# Patient Record
Sex: FEMALE | Race: WHITE
[De-identification: names, ages, dates, MRNs, and addresses within clinical notes are randomized per-mention and may not be internally consistent; named-entity substitution may affect disease eponyms.]

---

## 2020-02-26 ENCOUNTER — HOSPITAL ENCOUNTER (OUTPATIENT)
Dept: HOSPITAL 56 - MW.SDS | Age: 40
LOS: 1 days | Discharge: HOME | End: 2020-02-27
Payer: COMMERCIAL
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
## 2020-02-26 DIAGNOSIS — S83.512A: Primary | ICD-10-CM

## 2020-02-26 DIAGNOSIS — Z79.899: ICD-10-CM

## 2020-02-26 DIAGNOSIS — S83.242A: ICD-10-CM
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
## 2020-02-26 DIAGNOSIS — Z88.1: ICD-10-CM

## 2020-02-26 PROCEDURE — 97161 PT EVAL LOW COMPLEX 20 MIN: CPT

## 2020-02-26 PROCEDURE — C1762 CONN TISS, HUMAN(INC FASCIA): HCPCS

## 2020-02-26 PROCEDURE — 29888 ARTHRS AID ACL RPR/AGMNTJ: CPT

## 2020-02-26 PROCEDURE — 29881 ARTHRS KNE SRG MNISECTMY M/L: CPT

## 2020-02-26 PROCEDURE — C1713 ANCHOR/SCREW BN/BN,TIS/BN: HCPCS

## 2020-02-26 PROCEDURE — 97530 THERAPEUTIC ACTIVITIES: CPT

## 2020-02-26 PROCEDURE — C1776 JOINT DEVICE (IMPLANTABLE): HCPCS

## 2020-02-26 PROCEDURE — 81025 URINE PREGNANCY TEST: CPT

## 2020-02-26 RX ADMIN — FENTANYL CITRATE PRN MCG: 50 INJECTION, SOLUTION INTRAMUSCULAR; INTRAVENOUS at 12:10

## 2020-02-26 RX ADMIN — FENTANYL CITRATE PRN MCG: 50 INJECTION, SOLUTION INTRAMUSCULAR; INTRAVENOUS at 12:15

## 2020-02-26 RX ADMIN — OXYCODONE HYDROCHLORIDE AND ACETAMINOPHEN PRN TAB: 5; 325 TABLET ORAL at 20:52

## 2020-02-26 NOTE — PCM.OPNOTE
- General Post-Op/Procedure Note


Date of Surgery/Procedure: 02/26/20


Operative Procedure(s): left acl reconstruction.  partial medial menisectomy


Pre Op Diagnosis: left acl tear


Post-Op Diagnosis: left acl tear.  left medial meniscus tear


Anesthesia Technique: General ET Tube


Primary Surgeon: Christ Robles


EBL in mLs: 25


Complications: None


Condition: Good


Free Text/Narrative:: 


 Intake & Output











 02/25/20 02/26/20 02/26/20





 22:59 06:59 14:59


 


Intake Total   1400


 


Balance   1400

## 2020-02-26 NOTE — OR
SURGEON:

Christ Robles

 

DATE OF PROCEDURE:  02/26/2020

 

PREOPERATIVE DIAGNOSIS:

Left knee anterior cruciate ligament tear.

 

POSTOPERATIVE DIAGNOSES:

1. Left knee anterior cruciate ligament tear.

2. Left knee medial meniscus tear, anterior.

 

PRIMARY SURGEON:

Christ Robles DO.

 

ASSISTANT:

LISSETTE Iqbal.

 

ROLE OF ASSISTANT:

Nurse practitioner, LISSETTE Iqbal, played an essential role in assisting in

this case, helping to position the patient, retract structures as needed, as

well as suturing and cutting sutures as indicated.  Her presence improved

patient's safety and decreased operative time.

 

ANESTHESIA:

General endotracheal intubation.

 

FLUID:

Lactated Ringer's solution.

 

ESTIMATED BLOOD LOSS:

25 mL.

 

COMPLICATION:

None.

 

SPECIMEN:

None.

 

DISCHARGE DISPOSITION:

Stable to PACU.

 

INSTRUMENTATION:

Ruddy.

 

HISTORY AND INDICATIONS FOR THE PROCEDURE:

The patient was seen preoperatively in the clinic.  She had undergone a skiing

accident.  Preoperative imaging confirmed the above-mentioned diagnosis.  Risks

and benefits of the procedure explained to the patient.  Informed consent was

obtained.

 

DETAILS OF PROCEDURE:

The patient was seen preoperatively by myself and the Anesthesia staff in the

preoperative holding area where the operative site was marked.  She was brought

to the operative suite by the Anesthesia staff where general anesthesia was

administered.  All extremities were found to be well padded.  The right leg was

placed into a stirrup.  The left lower extremity was placed into a leg dumont.

The left lower extremity had a well-padded tourniquet placed.  It was then

prepped and draped in a sterile manner.  Time-out was called identifying the

correct patient, the correct procedure, the correct site, and that antibiotics

had been given within appropriate period of time.

 

The left lower extremity was exsanguinated.  Tourniquet was raised to 250 mmHg

and let down during closure.  A lateral portal was first made and then the joint

was explored.  There was significant difficulty with visualization due to

inflamed synovium.  I did not see any patellofemoral arthritis present.  Both

compartments looked good without any chondromalacia, medial and laterally.

There was a medial meniscus tear present.  I made a medial portal and then

debrided this with a shaver and an ablation unit.  I then inspected the anterior

cruciate ligament.  There were two balls of frayed tendon at the base that had

been fibrosed down the insertion and the tibia looked good.  However, upon

further inspection, almost 90% had been torn off from the femoral footprint.  At

that time, I took a shaver and took off the rest of the femoral footprint of the

ACL and then took Metzenbaum's and cut the distal stump.  I then used a shaver

to remove the remainder of the anterior cruciate ligament.  I then used the

ablation unit to clear the lateral notch as well as the stump.  I then used a

bur to perform the notchplasty inside of the lateral condyle.  At this point, my

assistant prepared our graft.  This measured 8.5 mm.  I used a 7 mm guide for

the back wall and then drilled my pin through and out the lateral femur and then

used a knife to bring it out through the skin.  We had dropped our measuring

instrument, so I drilled with a 4.5 reamer.  This measured to the outside wall

approximately 36 to 38.  I marked that for my button on the graft strands and

then 25 mm on the graft.  I then overdrilled 25 mm with an 8.5 reamer.  I then

passed my suture, passing suture up through the femur and then tagged it from

the lateral portal.  I then focussed on tibial prep.  I made my incision

horizontally about 2-1/2 fingerbreadths distal to the joint line, 3/4 of the way

to the medial side of the tibia.  I then used a 55 degree reamer and drilled the

pin.  At that point, I removed my guide and then reamed with an 8.5 mm reamer.

I then got pituitaries and grabbed the suture loop and then passed it distally

through the tibia.  I then attached my graft and then pulled it all the way

through the femoral side until the button was through the distal femoral cortex.

I then flipped the button and then pulled on it to make sure it was secure.  I

then brought my graft back up through the femoral tunnel until 25 mm had passed,

which I previously marked.  I then cycled the knee, and then at that point, I

placed my guide pin for my interference screw in the tibia and then screwed in

my interference screw.  This provided excellent stability with a negative

anterior drawer.  We then had my assistant then close all of my incisions with 3-

0 nylon followed by Betadine-soaked Adaptic, fluffs, and an Ace wrap.  She was

then taken to the PACU in stable condition.

 

 

[id number] / MODL

DD:  02/26/2020 14:37:20

DT:  02/26/2020 16:58:06

Job #:  [id number]

## 2020-02-26 NOTE — PCM.PREANE
Preanesthetic Assessment





- Anesthesia/Transfusion/Family Hx


Anesthesia History: No Prior Anesthesia


Family History of Anesthesia Reaction: No


Transfusion History: No Prior Transfusion(s)





- Review of Systems


General: No Symptoms


Pulmonary: No Symptoms


Cardiovascular: No Symptoms


Gastrointestinal: No Symptoms


Neurological: No Symptoms


Other: Reports: None





- Physical Assessment


NPO Status Date: 02/25/20


NPO Status Time: 22:30


Vital Signs: 





 Last Vital Signs











Temp  97.0 F   02/26/20 08:06


 


Pulse  91   02/26/20 08:06


 


Resp  18   02/26/20 08:06


 


BP  136/70   02/26/20 08:06


 


Pulse Ox  97   02/26/20 08:06











Height: 5 ft 4 in


Weight: 65.771 kg


ASA Class: 1


Mental Status: Alert & Oriented x3


Airway Class: Mallampati = 1


Dentition: Reports: Implants (all central maxillary incisors)


ROM/Head Extension: Full


Lungs: Clear to Auscultation, Normal Respiratory Effort


Cardiovascular: Regular Rate, Regular Rhythm





- Allergies


Allergies/Adverse Reactions: 


 Allergies











Allergy/AdvReac Type Severity Reaction Status Date / Time


 


amoxicillin Allergy  Rash Verified 02/20/20 12:36














- Blood


Blood Available: No





- Anesthesia Plan


Pre-Op Medication Ordered: None





- Acknowledgements


Anesthesia Type Planned: General Anesthesia


Pt an Appropriate Candidate for the Planned Anesthesia: Yes


Alternatives and Risks of Anesthesia Discussed w Pt/Guardian: Yes


Pt/Guardian Understands and Agrees with Anesthesia Plan: Yes


Additional Comments: 





PMH: none


PLAN: ga/lma





PreAnesthesia Questionnaire





- Past Health History


Medical/Surgical History: Denies Medical/Surgical History


HEENT History: Reports: Other (See Below)


Other HEENT History: wears glasses/contacts, dental implant


Cardiovascular History: Reports: None


Respiratory History: Reports: None


Gastrointestinal History: Reports: None, GERD


Genitourinary History: Reports: None


OB/GYN History: Reports: Pregnancy


Musculoskeletal History: Reports: None


Neurological History: Reports: None


Psychiatric History: Reports: None


Endocrine/Metabolic History: Reports: None


Hematologic History: Reports: None


Immunologic History: Reports: None


Oncologic (Cancer) History: Reports: None


Dermatologic History: Reports: None





- Infectious Disease History


Infectious Disease History: Reports: None





- Past Surgical History


Head Surgeries/Procedures: Reports: None


HEENT Surgical History: Reports: None


Cardiovascular Surgical History: Reports: None


Respiratory Surgical History: Reports: None


GI Surgical History: Reports: None


Female  Surgical History: Reports: None


Endocrine Surgical History: Reports: None


Neurological Surgical History: Reports: None


Musculoskeletal Surgical History: Reports: None


Oncologic Surgical History: Reports: None


Dermatological Surgical History: Reports: None





- SUBSTANCE USE


Smoking Status *Q: Current Every Day Smoker


Tobacco Use Within Last Twelve Months: Cigarettes





- HOME MEDS


Home Medications: 


 Home Meds





Pnv No.95/Ferrous Fum/Folic AC [Prenatal Vitamin Tablet] 1 tab PO DAILY 02/20/ 20 [History]


clomiPHENE citrate [Clomiphene Citrate] 2 tab PO ASDIRECTED 02/20/20 [History]











- CURRENT (IN HOUSE) MEDS


Current Meds: 





 Current Medications





Clindamycin Phosphate 600 mg/ (Premix)  50 mls @ 92.593 mls/hr IV ONCALL [facility]


   Last Admin: 02/26/20 08:40 Dose:  92.593 mls/hr


Lactated Ringer's (Ringers, Lactated)  1,000 mls @ 100 mls/hr IV ASDIRECTED [facility]


   Last Admin: 02/26/20 08:35 Dose:  100 mls/hr





Discontinued Medications





Desflurane (Suprane) Confirm Administered Dose 240 ml .ROUTE .STK-MED ONE


   Stop: 02/24/20 06:56


Fentanyl (Sublimaze) Confirm Administered Dose 100 mcg .ROUTE .STK-MED ONE


   Stop: 02/26/20 07:12


Glycopyrrolate (Robinul) Confirm Administered Dose 0.2 mg .ROUTE .STK-MED ONE


   Stop: 02/26/20 08:50


Ketorolac Tromethamine (Toradol) Confirm Administered Dose 30 mg .ROUTE .STK-

MED ONE


   Stop: 02/26/20 07:12


Lidocaine (Xylocaine-Mpf 2%) Confirm Administered Dose 5 ml .ROUTE .STK-MED ONE


   Stop: 02/26/20 08:51


Midazolam HCl (Versed 1 Mg/Ml) Confirm Administered Dose 2 mg .ROUTE .STK-MED 

ONE


   Stop: 02/26/20 07:12


Ondansetron HCl (Zofran) Confirm Administered Dose 4 mg .ROUTE .STK-MED ONE


   Stop: 02/26/20 07:12


Propofol (Diprivan  20 Ml) Confirm Administered Dose 400 mg .ROUTE .STK-MED ONE


   Stop: 02/26/20 07:12

## 2020-02-26 NOTE — PCM.POSTAN
POST ANESTHESIA ASSESSMENT





- MENTAL STATUS


Mental Status: Alert, Oriented





- VITAL SIGNS


Vital Signs: 


 Last Vital Signs











Temp  99.1 F   02/26/20 11:46


 


Pulse  85   02/26/20 13:36


 


Resp  12   02/26/20 13:36


 


BP  116/51 L  02/26/20 13:36


 


Pulse Ox  94 L  02/26/20 13:36














- RESPIRATORY


Respiratory Status: Respiratory Rate WNL, Airway Patent, O2 Saturation Stable





- CARDIOVASCULAR


CV Status: Pulse Rate WNL, Blood Pressure Stable





- GASTROINTESTINAL


GI Status: No Symptoms





- PAIN


Pain Score: 6





- POST OP HYDRATION


Hydration Status: Adequate & Stable





- OBSERVATIONS


Free Text/Narrative:: 


Pt has received extensive narcotics (4mg Dilaudid/100 mcg Fentanyl/25mg Ketamine

) plus Tordol, IV Ofirmev, and Benadryl for itching. After exhausting the IV 

route of pain control, pt and surgeon were in agreement with tying a femoral 

nerve block. Prior to nerve block, pt was rating her pain 7-8/10, post nerve 

block she is rating her pain 6/10 and states "it comes and goes". Discussed 

with Dr. Robles and he wants her to return to phase II and he will evaluate her 

there regarding staying overnight vs discharging home.

## 2020-02-26 NOTE — PCM.DCSUM1
**Discharge Summary





- Hospital Course


Diagnosis: Stroke: No





- Discharge Data


Discharge Date: 02/27/20


Discharge Disposition: Home, Self-Care 01


Condition: Good





- Referral to Home Health


Primary Care Physician: 


PCP None








- Patient Summary/Data


Operative Procedure(s) Performed: left acl reconstruction.  partial medial 

menisectomy


Complications: none


Consults: 


 Consultations





02/26/20 16:57


PT Evaluation and Treatment [CONS] Routine 














- Patient Instructions


Diet: Usual Diet as Tolerated


Activity: Apply Ice (Ice left knee), Elevate Extremity, No Strenuous Activities

, Rest and Relax Today


Activity, Other: WBAT with use of crutches for ambulation


Driving: Do Not Drive


Driving, Other: no driving while taking narcotic medication


Showering/Bathing: May Shower in 3 Days (leave surgical dressing on (the brown 

ACE bandage) for 72 hours, then may remove bandages, shower and apply new 

bandages (to cover sutures, to keep them from snagging on clothing).)


Wound/Incision Care: Keep Operative Site/Wound Site Clean and Dry, Change 

Dressing Daily (apply clean bandage over incision sites daily )


Notify Provider of: Fever, Increased Pain, Swelling and Redness, Drainage





- Discharge Plan


*PRESCRIPTION DRUG MONITORING PROGRAM REVIEWED*: Yes


*COPY OF PRESCRIPTION DRUG MONITORING REPORT IN PATIENT ARTEM: No


Prescriptions/Med Rec: 


oxyCODONE HCl/Acetaminophen [Percocet 5-325 mg Tablet] 1 each PO Q6HR #28 tablet


hydrOXYzine pamoate [Vistaril] 25 mg PO Q8H #15 cap


Ondansetron HCl [Zofran] 8 mg PO TID #15 tablet


Home Medications: 


 Home Meds





Pnv No.95/Ferrous Fum/Folic AC [Prenatal Vitamin Tablet] 1 tab PO DAILY 02/20/ 20 [History]


clomiPHENE citrate [Clomiphene Citrate] 2 tab PO ASDIRECTED 02/20/20 [History]


Ondansetron HCl [Zofran] 8 mg PO TID #15 tablet 02/26/20 [Rx]


hydrOXYzine pamoate [Vistaril] 25 mg PO Q8H #15 cap 02/26/20 [Rx]


oxyCODONE HCl/Acetaminophen [Percocet 5-325 mg Tablet] 1 each PO Q6HR #28 

tablet 02/26/20 [Rx]








Referrals: 


Norby,Christina A, NP [Nurse Practitioner] - 03/12/20 1:00 am





- Discharge Summary/Plan Comment


DC Time >30 min.: No





- General Info


Date of Service: 02/26/20


Functional Status: Reports: Pain Controlled, Tolerating Diet, Ambulating, 

Urinating





- Review of Systems


General: Reports: No Symptoms


HEENT: Reports: No Symptoms


Pulmonary: Reports: No Symptoms


Cardiovascular: Reports: No Symptoms


Gastrointestinal: Reports: No Symptoms


Genitourinary: Reports: No Symptoms


Musculoskeletal: Reports: Leg Pain, Joint Pain, Joint Swelling


Skin: Reports: No Symptoms


Neurological: Reports: No Symptoms


Psychiatric: Reports: No Symptoms





- Patient Data


Vitals - Most Recent: 


 Last Vital Signs











Temp  36.9 C   02/26/20 13:58


 


Pulse  75   02/26/20 15:44


 


Resp  14   02/26/20 15:44


 


BP  122/59 L  02/26/20 15:44


 


Pulse Ox  98   02/26/20 15:44











Weight - Most Recent: 65.771 kg


I&O - Last 24 hours: 


 Intake & Output











 02/26/20 02/26/20 02/26/20





 06:59 14:59 22:59


 


Intake Total  1400 


 


Balance  1400 











Lab Results - Last 24 hrs: 


 Laboratory Results - last 24 hr











  02/26/20 Range/Units





  08:10 


 


Urine HCG, Qual  NEGATIVE  (NEGATIVE)  











Med Orders - Current: 


 Current Medications





Celecoxib (Celebrex)  100 mg PO BID [facility]


Dexamethasone (Dexamethasone)  10 mg IVPUSH TID [facility]


Diazepam (Valium.)  5 mg PO TID PRN


   PRN Reason: Pain


Diphenhydramine HCl (Benadryl)  12.5 mg IVPUSH .PRN PRN


   PRN Reason: Itching


   Last Admin: 02/26/20 13:39 Dose:  12.5 mg


Docusate Sodium (Colace)  100 mg PO BID [facility]


Gabapentin (Neurontin)  300 mg PO TID [facility]


Hydromorphone HCl (Dilaudid)  1 mg IVPUSH 15 PRN


   PRN Reason: Abdominal Pain


Clindamycin Phosphate 600 mg/ (Premix)  50 mls @ 92.593 mls/hr IV ONCALL [facility]


   Last Admin: 02/26/20 08:40 Dose:  92.593 mls/hr


Lactated Ringer's (Ringers, Lactated)  1,000 mls @ 100 mls/hr IV ASDIRECTED [facility]


   Last Admin: 02/26/20 08:35 Dose:  100 mls/hr


Acetaminophen 1,000 mg/ Premix  100 mls @ 400 mls/hr IV ONETIME PRN


   PRN Reason: Pain


   Last Admin: 02/26/20 11:55 Dose:  400 mls/hr


Oxycodone/Acetaminophen (Percocet 325-5 Mg)  1 - 2 tab PO Q4H PRN


   PRN Reason: Pain


Tramadol HCl (Ultram)  50 - 100 mg PO Q4H PRN


   PRN Reason: Pain





Discontinued Medications





Bupivacaine HCl/Epinephrine Bitart (Marcaine 0.5%/Epinephrine 1:200,000) 

Confirm Administered Dose 20 ml .ROUTE .STK-MED ONE


   Stop: 02/26/20 11:30


Bupivacaine HCl/Epinephrine Bitart (Marcaine 0.25%/Epinephrine 1:200,000) 

Confirm Administered Dose 20 ml .ROUTE .STK-MED ONE


   Stop: 02/26/20 14:12


Desflurane (Suprane) Confirm Administered Dose 240 ml .ROUTE .STK-MED ONE


   Stop: 02/24/20 06:56


Diphenhydramine HCl (Benadryl) Confirm Administered Dose 50 mg .ROUTE .STK-MED 

ONE


   Stop: 02/26/20 13:38


Fentanyl (Sublimaze) Confirm Administered Dose 100 mcg .ROUTE .STK-MED ONE


   Stop: 02/26/20 07:12


Fentanyl (Sublimaze) Confirm Administered Dose 100 mcg .ROUTE .STK-MED ONE


   Stop: 02/26/20 10:05


Fentanyl (Sublimaze)  50 mcg IVPUSH Q5M PRN


   PRN Reason: Pain


   Last Admin: 02/26/20 12:15 Dose:  50 mcg


Fentanyl (Sublimaze) Confirm Administered Dose 100 mcg .ROUTE .STK-MED ONE


   Stop: 02/26/20 10:46


Fentanyl (Sublimaze) Confirm Administered Dose 100 mcg .ROUTE .STK-MED ONE


   Stop: 02/26/20 11:23


Glycopyrrolate (Robinul) Confirm Administered Dose 0.2 mg .ROUTE .STK-MED ONE


   Stop: 02/26/20 08:50


Hydromorphone HCl (Dilaudid) Confirm Administered Dose 2 mg .ROUTE .STK-MED ONE


   Stop: 02/26/20 11:50


Hydromorphone HCl (Dilaudid)  0 mg IVPUSH ONETIME ONE


   Stop: 02/26/20 12:14


   Last Admin: 02/26/20 11:50 Dose:  4 mg


Hydromorphone HCl (Dilaudid)  2 mg IVPUSH ONETIME ONE


   Stop: 02/26/20 12:47


   Last Admin: 02/26/20 13:06 Dose:  1 mg


Acetaminophen (Ofirmev) Confirm Administered Dose 100 mls @ as directed .ROUTE 

.STK-MED ONE


   Stop: 02/26/20 11:54


Ketamine HCl (Ketalar) Confirm Administered Dose 500 mg .ROUTE .STK-MED ONE


   Stop: 02/26/20 12:42


Ketamine HCl (Ketalar)  25 mg IV ONETIME ONE


   Stop: 02/26/20 12:46


   Last Admin: 02/26/20 12:44 Dose:  25 mg


Ketorolac Tromethamine (Toradol) Confirm Administered Dose 30 mg .ROUTE .STK-

MED ONE


   Stop: 02/26/20 07:12


Ketorolac Tromethamine (Toradol) Confirm Administered Dose 30 mg .ROUTE .STK-

MED ONE


   Stop: 02/26/20 12:42


Ketorolac Tromethamine (Toradol)  30 mg IVPUSH ONETIME ONE


   Stop: 02/26/20 12:47


   Last Admin: 02/26/20 12:46 Dose:  30 mg


Lidocaine (Xylocaine-Mpf 2%) Confirm Administered Dose 5 ml .ROUTE .STK-MED ONE


   Stop: 02/26/20 08:51


Lidocaine HCl (Xylocaine 1%) Confirm Administered Dose 20 ml .ROUTE .STK-MED ONE


   Stop: 02/26/20 09:23


Midazolam HCl (Versed 1 Mg/Ml) Confirm Administered Dose 2 mg .ROUTE .STK-MED 

ONE


   Stop: 02/26/20 07:12


Ondansetron HCl (Zofran) Confirm Administered Dose 4 mg .ROUTE .STK-MED ONE


   Stop: 02/26/20 07:12


Propofol (Diprivan  20 Ml) Confirm Administered Dose 400 mg .ROUTE .STK-MED ONE


   Stop: 02/26/20 07:12











- Exam


General: Reports: Alert, Oriented, Cooperative, Mild Distress


HEENT: Reports: Pupils Equal, Pupils Reactive, EOMI, Mucous Membr. Moist/Pink


Neck: Reports: Supple, Trachea Midline


Lungs: Reports: Normal Respiratory Effort


Extremities: Leg Pain, Limited Range of Motion


Skin: Reports: Warm, Dry, Intact


Wound/Incisions: Reports: Healing Well, Dressing Dry and Intact


Neurological: Reports: No New Focal Deficit


Psy/Mental Status: Reports: Alert, Normal Affect, Normal Mood





Discharge Operative/Procedures





- Procedures Performed


Operations: left acl reconstruction


LP Indication: CSF analysis


Arterial Line Indication: hemodynamic monitoring


Chest Tube Indication: pneumothorax


Thoracentesis Indication: pleural effusion


Paracentesis Indication: ascites

## 2020-02-27 VITALS — DIASTOLIC BLOOD PRESSURE: 70 MMHG | HEART RATE: 67 BPM | SYSTOLIC BLOOD PRESSURE: 102 MMHG

## 2020-02-27 RX ADMIN — OXYCODONE HYDROCHLORIDE AND ACETAMINOPHEN PRN TAB: 5; 325 TABLET ORAL at 10:51

## 2020-02-27 RX ADMIN — OXYCODONE HYDROCHLORIDE AND ACETAMINOPHEN PRN TAB: 5; 325 TABLET ORAL at 06:50

## 2020-02-27 NOTE — [id number]
Post Anesthesia Note





- EVALUATION WITHIN 48HRS OF ANESTHETIC


Vital Signs in Normal Range: Yes


Patient Participated in Evaluation: Yes


Respiratory Function Stable: Yes


Airway Patent: Yes


Cardiovascular Function Stable: Yes


Hydration Status Stable: Yes


Pain Control Satisfactory: Yes


Nausea and Vomiting Control Satisfactory: Yes


Mental Status Recovered: Yes


Vital Signs: 


 Last Vital Signs











Temp  36.5 C   02/27/20 03:56


 


Pulse  71   02/27/20 06:47


 


Resp  12   02/27/20 06:47


 


BP  117/69   02/27/20 06:47


 


Pulse Ox  96   02/27/20 06:47

## 2020-11-04 NOTE — PCM.PNLD
Labor Progress Note





- VS & Meds


Active Medications: 


                               Current Medications





Butorphanol Tartrate (Stadol)  1 mg IVPUSH Q1H PRN


   PRN Reason: Pain


Carboprost Tromethamine (Hemabate Ds)  250 mcg IM ASDIRECTED PRN


   PRN Reason: Post Partum Hemorrhage


Oxytocin/Sodium Chloride (Oxytocin 30 Unit/500 Ml-Ns)  30 unit in 500 mls @ 999 

mls/hr IV TITRATE [facility]


Tranexamic Acid 1,000 mg/ (Sodium Chloride)  110 mls @ 660 mls/hr IV ONETIME PRN


   PRN Reason: Bleeding


Oxytocin/Sodium Chloride (Oxytocin 30 Unit/500 Ml-Ns)  30 unit in 500 mls @ 2 

mls/hr IV TITRATE [facility]IA; Protocol


   Last Titration: 20 15:21 Dose:  8 munits/min, 8 mls/hr


   Documented by: 


Clindamycin Phosphate 900 mg/ (Premix)  50 mls @ 100 mls/hr IV Q8H [facility]


   Last Admin: 20 11:19 Dose:  100 mls/hr


   Documented by: 


Lactated Ringer's (Ringers, Lactated)  1,000 mls @ 150 mls/hr IV ASDIRECTED [facility]


   Last Admin: 20 10:24 Dose:  150 mls/hr


   Documented by: 


Lidocaine HCl (Xylocaine 1%)  50 ml INJECT ONETIME PRN


   PRN Reason: Laceration repair


Methylergonovine Maleate (Methergine)  0.2 mg IM ASDIRECTED PRN


   PRN Reason: Post Partum Hemorrhage


Misoprostol (Cytotec)  200 mcg PO ONETIME PRN


   PRN Reason: Post Partum Hemorrhage


Misoprostol (Cytotec)  25 mcg VAG Q4H PRN


   PRN Reason: Cervical Ripening


   Last Admin: 20 03:17 Dose:  25 mcg


   Documented by: 


Misoprostol (Cytotec)  25 mcg PO Q4H PRN


   PRN Reason: Cervical Ripening


   Last Admin: 20 03:18 Dose:  25 mcg


   Documented by: 


Ondansetron HCl (Zofran)  4 mg IVPUSH Q6H PRN


   PRN Reason: Nausea/Vomiting


Sodium Chloride (Saline Flush)  10 ml FLUSH ASDIRECTED PRN


   PRN Reason: Keep Vein Open


Sodium Chloride (Saline Flush)  2.5 ml FLUSH ASDIRECTED PRN


   PRN Reason: Keep Vein Open


Sodium Chloride (Normal Saline)  10 ml IV ASDIRECTED PRN


   PRN Reason: IV Use


Sterile Water (Sterile Water For Irrigation)  1,000 ml IRR ASDIRECTED PRN


   PRN Reason: delivery


Terbutaline Sulfate (Brethine)  0.25 mg SUBCUT ASDIRECTED PRN


   PRN Reason: Tacysystole











- Uterine Contractions


Uterine Monitoring Mode: External [place]


Contraction Intensity: Mild to Moderate





- Fetal Monitoring


Fetal Monitor Mode: Doppler/Auscultation


Fetal Heart Rate (FHR) Variability: Moderate (6-25 bmp)


Fetal Accelerations: Present, 15x15


Fetal Decelerations: None


Fetal Strip Review: Category I





- Vaginal Exam


Dilation (cm): 4


Effacement (Percent): 80


Station: -2


Cervical Position: Posterior


Sterile Vaginal Exam Performed By: Dr Centeno





- Labor Progress (Free Text)


Labor Progress: 


LATE ENTRY:





41yo  @ 38w4d GA being induced for AMA.


S/p Cytotec round x1 at 3am.


Has made some change, but now contractions getting farther apart.


Will start pitocin per protocol


+GBS in setting of PCN allergy s/p clindamycin x2dose.


Epidural as needed.


Will reassess in 4 hours or earlier PRN.

## 2020-11-04 NOTE — PCM.PREANE
Preanesthetic Assessment





- Procedure


Proposed Procedure: 





JAJA





- Anesthesia/Transfusion/Family Hx


Anesthesia History: Prior Anesthesia Without Reaction


Family History of Anesthesia Reaction: No


Transfusion History: No Prior Transfusion(s)


Intubation History: Unknown





- Review of Systems


General: No Symptoms


Pulmonary: Other (+ smoking Hx)


Cardiovascular: No Symptoms


Gastrointestinal: Other (Reflux)


Neurological: No Symptoms


Other: Reports: Anxiety





- Physical Assessment


NPO Status Date: 11/04/20


NPO Status Time: 17:37 (Clear liquids)


Height: 1.63 m


Weight: 74.843 kg


ASA Class: 2


Mental Status: Alert & Oriented x3


Airway Class: Mallampati = 1


Dentition: Reports: Normal Dentition, Implants


Thyro-Mental Finger Breadths: 3


Mouth Opening Finger Breadths: 3


ROM/Head Extension: Full


Lungs: Clear to Auscultation


Cardiovascular: Regular Rate (Discussed.  ? Answered.  Some issues with elevated

BP @ times.  Permit signed.  Acceptable candidate.  4-5 cm.  Active labor. On 

Pitocin.)





- Lab


Values: 





                             Laboratory Last Values











WBC  7.37 K/uL (4.0-11.0)   11/04/20  02:35    


 


RBC  4.14 M/uL (4.30-5.90)  L  11/04/20  02:35    


 


Hgb  13.3 g/dL (12.0-16.0)   11/04/20  02:35    


 


Hct  39.2 % (36.0-46.0)   11/04/20  02:35    


 


MCV  94.7 fL (80.0-98.0)   11/04/20  02:35    


 


MCH  32.1 pg (27.0-32.0)  H  11/04/20  02:35    


 


MCHC  33.9 g/dL (31.0-37.0)   11/04/20  02:35    


 


RDW Std Deviation  43.1 fl (28.0-62.0)   11/04/20  02:35    


 


RDW Coeff of Morena  13 % (11.0-15.0)   11/04/20  02:35    


 


Plt Count  284 K/uL (150-400)   11/04/20  02:35    


 


MPV  11.60 fL (7.40-12.00)   11/04/20  02:35    


 


Nucleated RBC %  0.0 /100WBC  11/04/20  02:35    


 


Nucleated RBCs #  0 K/uL  11/04/20  02:35    


 


Blood Type  A POSITIVE   11/04/20  02:35    


 


Antibody Screen  NEGATIVE   11/04/20  02:35    














- Allergies


Allergies/Adverse Reactions: 


                                    Allergies











Allergy/AdvReac Type Severity Reaction Status Date / Time


 


amoxicillin Allergy  Rash Verified 02/27/20 02:41














PreAnesthesia Questionnaire





- Past Health History


Medical/Surgical History: Denies Medical/Surgical History


HEENT History: Reports: Other (See Below)


Other HEENT History: wears glasses/contacts, dental implant


Cardiovascular History: Reports: None


Respiratory History: Reports: None


Gastrointestinal History: Reports: None, GERD


Genitourinary History: Reports: None


OB/GYN History: Reports: Pregnancy


Musculoskeletal History: Reports: None


Neurological History: Reports: None


Psychiatric History: Reports: None


Endocrine/Metabolic History: Reports: None


Hematologic History: Reports: None


Immunologic History: Reports: None


Oncologic (Cancer) History: Reports: None


Dermatologic History: Reports: None





- Infectious Disease History


Infectious Disease History: Reports: Chicken Pox





- Past Surgical History


Head Surgeries/Procedures: Reports: None


HEENT Surgical History: Reports: None


Cardiovascular Surgical History: Reports: None


Respiratory Surgical History: Reports: None


GI Surgical History: Reports: None


Female  Surgical History: Reports: None


Endocrine Surgical History: Reports: None


Neurological Surgical History: Reports: None


Musculoskeletal Surgical History: Reports: None


Oncologic Surgical History: Reports: None


Dermatological Surgical History: Reports: None





- SUBSTANCE USE


Tobacco Use Status *Q: Light Tobacco User


Tobacco Use Within Last Twelve Months: Cigarettes


Recreational Drug Use History: No





- HOME MEDS


Home Medications: 


                                    Home Meds





Pnv No.95/Ferrous Fum/Folic AC [Prenatal Vitamin Tablet] 1 tab PO DAILY 02/20/20

[History]


clomiPHENE citrate [Clomiphene Citrate] 2 tab PO ASDIRECTED 02/20/20 [History]


hydrOXYzine pamoate [Vistaril] 25 mg PO Q8H #15 cap 02/26/20 [Rx]


ondansetron HCL [Zofran] 8 mg PO TID #15 tablet 02/26/20 [Rx]


oxyCODONE HCl/Acetaminophen [Percocet 5-325 mg Tablet] 1 each PO Q6HR #28 tablet

02/26/20 [Rx]











- CURRENT (IN HOUSE) MEDS


Current Meds: 





                               Current Medications





Butorphanol Tartrate (Stadol)  1 mg IVPUSH Q1H PRN


   PRN Reason: Pain


Carboprost Tromethamine (Hemabate Ds)  250 mcg IM ASDIRECTED PRN


   PRN Reason: Post Partum Hemorrhage


Oxytocin/Sodium Chloride (Oxytocin 30 Unit/500 Ml-Ns)  30 unit in 500 mls @ 999 

mls/hr IV TITRATE SIA


Tranexamic Acid 1,000 mg/ (Sodium Chloride)  110 mls @ 660 mls/hr IV ONETIME PRN


   PRN Reason: Bleeding


Oxytocin/Sodium Chloride (Oxytocin 30 Unit/500 Ml-Ns)  30 unit in 500 mls @ 2 

mls/hr IV TITRATE [facility]; Protocol


   Last Titration: 11/04/20 16:30 Dose:  10 munits/min, 10 mls/hr


   Documented by: 


Clindamycin Phosphate 900 mg/ (Premix)  50 mls @ 100 mls/hr IV Q8H [facility]


   Last Admin: 11/04/20 11:19 Dose:  100 mls/hr


   Documented by: 


Lactated Ringer's (Ringers, Lactated)  1,000 mls @ 150 mls/hr IV ASDIRECTED [facility]


   Last Admin: 11/04/20 10:24 Dose:  150 mls/hr


   Documented by: 


Lidocaine HCl (Xylocaine 1%)  50 ml INJECT ONETIME PRN


   PRN Reason: Laceration repair


Methylergonovine Maleate (Methergine)  0.2 mg IM ASDIRECTED PRN


   PRN Reason: Post Partum Hemorrhage


Misoprostol (Cytotec)  200 mcg PO ONETIME PRN


   PRN Reason: Post Partum Hemorrhage


Misoprostol (Cytotec)  25 mcg VAG Q4H PRN


   PRN Reason: Cervical Ripening


   Last Admin: 11/04/20 03:17 Dose:  25 mcg


   Documented by: 


Misoprostol (Cytotec)  25 mcg PO Q4H PRN


   PRN Reason: Cervical Ripening


   Last Admin: 11/04/20 03:18 Dose:  25 mcg


   Documented by: 


Ondansetron HCl (Zofran)  4 mg IVPUSH Q6H PRN


   PRN Reason: Nausea/Vomiting


Sodium Chloride (Saline Flush)  10 ml FLUSH ASDIRECTED PRN


   PRN Reason: Keep Vein Open


Sodium Chloride (Saline Flush)  2.5 ml FLUSH ASDIRECTED PRN


   PRN Reason: Keep Vein Open


Sodium Chloride (Normal Saline)  10 ml IV ASDIRECTED PRN


   PRN Reason: IV Use


Sterile Water (Sterile Water For Irrigation)  1,000 ml IRR ASDIRECTED PRN


   PRN Reason: delivery


Terbutaline Sulfate (Brethine)  0.25 mg SUBCUT ASDIRECTED PRN


   PRN Reason: Tacysystole





Discontinued Medications





Fentanyl (Sublimaze) Confirm Administered Dose 100 mcg .ROUTE .STK-MED ONE


   Stop: 11/04/20 17:02


Ropivacaine (Naropin 0.2%) Confirm Administered Dose 100 mls @ as directed 

.ROUTE .STK-MED ONE


   Stop: 11/04/20 17:02


Ropivacaine (Naropin 0.2%) Confirm Administered Dose 20 ml .ROUTE .STK-MED ONE


   Stop: 11/04/20 17:02

## 2020-11-04 NOTE — PCM.LDHP
L&D History of Present Illness





- General


Date of Service: 20


Admit Problem/Dx: 


                   Patient Status Order with Admit Dx/Problem





20 02:42


Patient Status [ADT] Routine 








                           Admission Diagnosis/Problem











Admission Diagnosis/Problem    Pregnancy














Source of Information: Patient


History Limitations: Reports: No Limitations





- History of Present Illness


Introduction:: 





41 yo   at 38w4d [place] admitted for IOL


Pregnancy complicated by AMA, GBS bacteruia, h/o positive covid test s/p 

isolation until 1 week ago, and PCN allergy.  


No current Sxs and no complaints. No Ctx, no bleeding.


OB h/o significant for  2.5 yrs ago of 6lbs BG. 





- Related Data


Allergies/Adverse Reactions: 


                                    Allergies











Allergy/AdvReac Type Severity Reaction Status Date / Time


 


amoxicillin Allergy  Rash Verified 20 02:41











Home Medications: 


                                    Home Meds





Pnv No.95/Ferrous Fum/Folic AC [Prenatal Vitamin Tablet] 1 tab PO DAILY 20

[History]


clomiPHENE citrate [Clomiphene Citrate] 2 tab PO ASDIRECTED 20 [History]


hydrOXYzine pamoate [Vistaril] 25 mg PO Q8H #15 cap 20 [Rx]


ondansetron HCL [Zofran] 8 mg PO TID #15 tablet 20 [Rx]


oxyCODONE HCl/Acetaminophen [Percocet 5-325 mg Tablet] 1 each PO Q6HR #28 tablet

20 [Rx]











Past Medical History





- Past Health History


Medical/Surgical History: Denies Medical/Surgical History


HEENT History: Reports: Other (See Below)


Other HEENT History: wears glasses/contacts, dental implant


Cardiovascular History: Reports: None


Respiratory History: Reports: None


Gastrointestinal History: Reports: None, GERD


Genitourinary History: Reports: None


OB/GYN History: Reports: Pregnancy


Musculoskeletal History: Reports: None


Neurological History: Reports: None


Psychiatric History: Reports: None


Endocrine/Metabolic History: Reports: None


Hematologic History: Reports: None


Immunologic History: Reports: None


Oncologic (Cancer) History: Reports: None


Dermatologic History: Reports: None





- Infectious Disease History


Infectious Disease History: Reports: Chicken Pox





- Past Surgical History


Head Surgeries/Procedures: Reports: None


HEENT Surgical History: Reports: None


Cardiovascular Surgical History: Reports: None


Respiratory Surgical History: Reports: None


GI Surgical History: Reports: None


Female  Surgical History: Reports: None


Endocrine Surgical History: Reports: None


Neurological Surgical History: Reports: None


Musculoskeletal Surgical History: Reports: None


Oncologic Surgical History: Reports: None


Dermatological Surgical History: Reports: None





Social & Family History





- Family History


Family Medical History: Noncontributory





- Tobacco Use


Tobacco Use Status *Q: Light Tobacco User


Years of Tobacco use: 15


Packs/Tins Daily: 1





- Caffeine Use


Caffeine Use: Reports: Coffee





- Recreational Drug Use


Recreational Drug Use: No





H&P Review of Systems





- Review of Systems:


Review Of Systems: See Below


General: Reports: No Symptoms


HEENT: Reports: No Symptoms


Pulmonary: Reports: No Symptoms


Cardiovascular: Reports: No Symptoms


Gastrointestinal: Reports: No Symptoms


Genitourinary: Reports: No Symptoms


Musculoskeletal: Reports: No Symptoms


Skin: Reports: No Symptoms


Psychiatric: Reports: No Symptoms


Neurological: Reports: No Symptoms


Hematologic/Lymphatic: Reports: No Symptoms


Immunologic: Reports: No Symptoms





L&D Exam





- Exam


Exam: See Below





- Vital Signs


Weight: 74.843 kg





- OB Specific


Contraction Intensity: Mild to Moderate


Fetal Movement: Active


Fetal Heart Tones: Present


Fetal Heart Rate (FHR) Variability: Moderate (6-25 bmp)


Birth Presentation: Vertex





- Pittman Score


Pittman Score Cervix Position: Midposition


Pittman Score Consistency: Medium


Pittman Score Dilation: 1-2 cm





- Exam


General: Alert, Oriented


Neck: Supple


Lungs: Normal Respiratory Effort


Cardiovascular: Regular Rate


GI/Abdominal Exam: Soft, Non-Tender


Back Exam: Normal Inspection


Extremities: Normal Inspection


Skin: Warm, Intact


Psychiatric: Alert, Normal Affect, Normal Mood





- Patient Data


Lab Results Last 24 hrs: 


                         Laboratory Results - last 24 hr











  20 Range/Units





  02:35 02:35 


 


WBC  7.37   (4.0-11.0)  K/uL


 


RBC  4.14 L   (4.30-5.90)  M/uL


 


Hgb  13.3   (12.0-16.0)  g/dL


 


Hct  39.2   (36.0-46.0)  %


 


MCV  94.7   (80.0-98.0)  fL


 


MCH  32.1 H   (27.0-32.0)  pg


 


MCHC  33.9   (31.0-37.0)  g/dL


 


RDW Std Deviation  43.1   (28.0-62.0)  fl


 


RDW Coeff of Morena  13   (11.0-15.0)  %


 


Plt Count  284   (150-400)  K/uL


 


MPV  11.60   (7.40-12.00)  fL


 


Nucleated RBC %  0.0   /100WBC


 


Nucleated RBCs #  0   K/uL


 


Blood Type   A POSITIVE  


 


Antibody Screen   NEGATIVE  











Result Diagrams: 


                                 20 02:35








- Problem List


(1) Encounter for induction of labor


SNOMED Code(s): 595286422


   ICD Code: Z34.90 - ENCNTR FOR SUPRVSN OF NORMAL PREGNANCY, UNSP, UNSP 

TRIMESTER   Status: Acute   Priority: High   Current Visit: Yes   





(2) Normal intrauterine pregnancy in third trimester


SNOMED Code(s): 07954014, 71656271


   ICD Code: Z34.93 - ENCNTR FOR SUPRVSN OF NORMAL PREG, UNSP, THIRD TRIMESTER  

 Status: Acute   Priority: Medium   Current Visit: Yes   





(3) AMA (advanced maternal age) primigravida 35+


SNOMED Code(s): 71881952


   ICD Code: O09.519 - SUPERVISION OF ELDERLY PRIMIGRAVIDA, UNSPECIFIED 

TRIMESTER   Status: Acute   Priority: High   Current Visit: Yes   


Qualifiers: 


   Trimester: third trimester   Qualified Code(s): O09.513 - Supervision of 

elderly primigravida, third trimester   


Problem List Initiated/Reviewed/Updated: Yes


Orders Last 24hrs: 


41 yo   at 38w4d GA admitted for IOL


Pregnancy complicated by AMA, GBS bacteriuia, h/o positive covid test s/p 

isolation until 1 week ago, and PCN allergy.


S/P 1 round of cytotec at 3am.  Patient micheline irregularly Q1-2 mns.  Cat 1

 tracing.


Will continue with expectant management.


GBS bacteriuia in setting of PCN allergy: S/P 1 dose of Clindamycin


                                        


                                        


                                        


                                        


                                        


                                        


                                        


                                        


                               Active Orders 24 hr











 Category Date Time Status


 


 Patient Status [ADT] Routine ADT  20 02:42 Active


 


 Bedrest Bathroom Privileges [RC] ASDIRECTED Care  20 02:42 Active


 


 Communication Order [RC] ASDIRECTED Care  20 02:42 Active


 


 Communication Order [RC] ASDIRECTED Care  20 02:42 Active


 


 Communication Order [RC] ASDIRECTED Care  20 02:42 Active


 


 Fetal Heart Tones [RC] CONTINUOUS Care  20 02:42 Active


 


 Fetal Non Stress Test [RC] PER UNIT ROUTINE Care  20 02:42 Active


 


 May Shower [RC] ASDIRECTED Care  20 02:42 Active


 


 Notify Provider [RC] PRN Care  20 02:42 Active


 


 Notify Provider [RC] PRN Care  20 02:42 Active


 


 Notify Provider [RC] PRN Care  20 02:42 Active


 


 Notify Provider [RC] STAT Care  20 02:42 Active


 


 Oxygen Therapy [RC] ASDIRECTED Care  20 02:42 Active


 


 Up ad Nikki [RC] ASDIRECTED Care  20 02:42 Active


 


 Vaginal Exam [RC] PRN Care  20 02:42 Active


 


 Vaginal Exam [RC] PRN Care  20 02:42 Active


 


 Vital Signs [RC] PER UNIT ROUTINE Care  20 02:42 Active


 


 Vital Signs [RC] PER UNIT ROUTINE Care  20 02:42 Active


 


 Regular Diet [DIET] Diet  20 Breakfast Active


 


 RPR (SYPHILIS SERO) W/ RFLX [REF] Routine Lab  20 02:35 Received


 


 Butorphanol [Stadol] Med  20 02:42 Active





 1 mg IVPUSH Q1H PRN   


 


 Carboprost Tromethamine [Hemabate DS] Med  20 02:42 Active





 250 mcg IM ASDIRECTED PRN   


 


 Clindamycin Phosphate in D5W [Cleocin in D5W] 900 mg Med  20 03:00 Active





 Premix Bag 1 bag   





 IV Q8H   


 


 Lactated Ringers [Ringers, Lactated] 1,000 ml Med  20 02:45 Active





 IV ASDIRECTED   


 


 Lidocaine 1% [Xylocaine 1%] Med  20 02:42 Active





 50 ml INJECT ONETIME PRN   


 


 Methylergonovine [Methergine] Med  20 02:42 Active





 0.2 mg IM ASDIRECTED PRN   


 


 Ondansetron [Zofran] Med  20 02:42 Active





 4 mg IVPUSH Q6H PRN   


 


 Oxytocin/0.9 % Sodium Chloride [Oxytocin 30 Unit/500 ML Med  20 02:45 

Active





 -NS]   





 30 unit in 500 ml IV TITRATE   


 


 Oxytocin/0.9 % Sodium Chloride [Oxytocin 30 Unit/500 ML Med  20 02:45 

Active





 -NS]   





 30 unit in 500 ml IV TITRATE   


 


 Sodium Chloride 0.9% [Normal Saline] Med  20 02:42 Active





 10 ml IV ASDIRECTED PRN   


 


 Sodium Chloride 0.9% [Saline Flush] Med  20 02:42 Active





 10 ml FLUSH ASDIRECTED PRN   


 


 Sodium Chloride 0.9% [Saline Flush] Med  20 02:42 Active





 2.5 ml FLUSH ASDIRECTED PRN   


 


 Terbutaline [Brethine] Med  20 02:42 Active





 0.25 mg SUBCUT ASDIRECTED PRN   


 


 Tranexamic Acid [Cyklokapron] 1,000 mg Med  20 02:42 Active





 Sodium Chloride 0.9% [Normal Saline] 100 ml   





 IV ONETIME   


 


 Water For Irrigation,Sterile [Sterile Water for Med  20 02:42 Active





 Irrigation]   





 1,000 ml IRR ASDIRECTED PRN   


 


 miSOPROStoL [Cytotec] Med  20 02:42 Active





 200 mcg PO ONETIME PRN   


 


 miSOPROStoL [Cytotec] Med  20 02:42 Active





 25 mcg PO Q4H PRN   


 


 miSOPROStoL [Cytotec] Med  20 02:42 Active





 25 mcg VAG Q4H PRN   


 


 Fetal Scalp Electrode [WOMSER] Per Unit Routine Oth  20 02:42 Ordered


 


 Medication Administration Instruction [OM.PC] Q3H Oth  20 02:45 Ordered


 


 Peripheral IV Insertion Adult [OM.PC] Routine Oth  20 02:42 Ordered


 


 Resuscitation Status Routine Resus Stat  20 02:42 Ordered








                                Medication Orders





Butorphanol Tartrate (Stadol)  1 mg IVPUSH Q1H PRN


   PRN Reason: Pain


Carboprost Tromethamine (Hemabate Ds)  250 mcg IM ASDIRECTED PRN


   PRN Reason: Post Partum Hemorrhage


Oxytocin/Sodium Chloride (Oxytocin 30 Unit/500 Ml-Ns)  30 unit in 500 mls @ 999 

mls/hr IV TITRATE SIA


Tranexamic Acid 1,000 mg/ (Sodium Chloride)  110 mls @ 660 mls/hr IV ONETIME PRN


   PRN Reason: Bleeding


Oxytocin/Sodium Chloride (Oxytocin 30 Unit/500 Ml-Ns)  30 unit in 500 mls @ 2 

mls/hr IV TITRATE [facility]; Protocol


Clindamycin Phosphate 900 mg/ (Premix)  50 mls @ 100 mls/hr IV Q8H [facility]


   Last Admin: 20 03:12  Dose: 100 mls/hr


   Documented by: YULIANA


Lactated Ringer's (Ringers, Lactated)  1,000 mls @ 150 mls/hr IV ASDIRECTED [facility]


   Last Admin: 20 10:24  Dose: 150 mls/hr


   Documented by: KEIRA


   Infusion: 20 09:52  Dose: 150 mls/hr


   Documented by: KEIRA


   Admin: 20 03:11  Dose: 150 mls/hr


   Documented by: YULIANA


Lidocaine HCl (Xylocaine 1%)  50 ml INJECT ONETIME PRN


   PRN Reason: Laceration repair


Methylergonovine Maleate (Methergine)  0.2 mg IM ASDIRECTED PRN


   PRN Reason: Post Partum Hemorrhage


Misoprostol (Cytotec)  200 mcg PO ONETIME PRN


   PRN Reason: Post Partum Hemorrhage


Misoprostol (Cytotec)  25 mcg VAG Q4H PRN


   PRN Reason: Cervical Ripening


   Last Admin: 20 03:17  Dose: 25 mcg


   Documented by: YULIANA


Misoprostol (Cytotec)  25 mcg PO Q4H PRN


   PRN Reason: Cervical Ripening


   Last Admin: 20 03:18  Dose: 25 mcg


   Documented by: YULIANA


Ondansetron HCl (Zofran)  4 mg IVPUSH Q6H PRN


   PRN Reason: Nausea/Vomiting


Sodium Chloride (Saline Flush)  10 ml FLUSH ASDIRECTED PRN


   PRN Reason: Keep Vein Open


Sodium Chloride (Saline Flush)  2.5 ml FLUSH ASDIRECTED PRN


   PRN Reason: Keep Vein Open


Sodium Chloride (Normal Saline)  10 ml IV ASDIRECTED PRN


   PRN Reason: IV Use


Sterile Water (Sterile Water For Irrigation)  1,000 ml IRR ASDIRECTED PRN


   PRN Reason: delivery


Terbutaline Sulfate (Brethine)  0.25 mg SUBCUT ASDIRECTED PRN


   PRN Reason: Tacysystole

## 2020-11-04 NOTE — PCM.DEL
L & D Note





- General Info


Date of Service: 20


Mother's Due Date: 20





- Delivery Note


Labor: Augmented by ARM, Augmented by Oxytocin


Cervical Ripening Method: Misoprostil


Delivery Outcome: Livebirth


Infant Delivery Method: Spontaneous Vaginal Delivery-Single


Birth Presentation: Vertex


Nuchal Cord: Present, Reduced


Anesthesia Type: Epidural


Amniotic Fluid Description: Clear


Episiotomy Type: None


Laceration: 1st Degree, Perineal, Vaginal


Suture type: Vicryl


Suture size: 3-0


Placenta: Intact, Spontaneous


Cord: 3 Vessels


Estimated Blood Loss: 400


Resuscitation Needed: Yes


: Suctioned, Cathether, Stimulated


APGAR Score 1 min: 7


APGAR Score 5 min: 8


Delivery Comments (Free Text/Narrative):: 





39yo G4 now  S/P  following induction with 1 round of cytotec then 

pitocin.


Prenatal history also complicated by GBS bacteruia in setting of PCN allergy, 

S/P intrapartum clindamycin x2 doses.





 of a live female, [id number] and Apgars 7/8. Delivered JENNIFER.


Presence of nuchal cord x1 which avulsed during attempt to reduce.  


Remainder of the baby was rapidly delivered within seconds, and cord proximal to

baby was held shut and immediately clamped.


Mouth was bulb suctioned and Baby was handed to nurse, for resuscitation.


Placenta delivered spontaneously, intact. Fundus firm, minimal bleeding. 

Placenta appeared


intact with 3 vessel cord. Perineum and vagina inspected  small 1st degree 

perineal laceration repaired


with 3-0 chromic suture in the usual fashion. EBL 400cc. Hemostasis. 


Patient tolerated procedure well, now recovering in LDR with baby, performing 

skin to skin.  





- General Info


Date of Service: 20


Admission Dx/Problem (Free Text): 


                   Patient Status Order with Admit Dx/Problem





20 02:42


Patient Status [ADT] Routine 








                           Admission Diagnosis/Problem











Admission Diagnosis/Problem    Pregnancy

















- Patient Data


Weight - Most Recent: 74.843 kg


Lab Results Last 24 Hours: 


                         Laboratory Results - last 24 hr











  20 Range/Units





  02:35 02:35 


 


WBC  7.37   (4.0-11.0)  K/uL


 


RBC  4.14 L   (4.30-5.90)  M/uL


 


Hgb  13.3   (12.0-16.0)  g/dL


 


Hct  39.2   (36.0-46.0)  %


 


MCV  94.7   (80.0-98.0)  fL


 


MCH  32.1 H   (27.0-32.0)  pg


 


MCHC  33.9   (31.0-37.0)  g/dL


 


RDW Std Deviation  43.1   (28.0-62.0)  fl


 


RDW Coeff of Morena  13   (11.0-15.0)  %


 


Plt Count  284   (150-400)  K/uL


 


MPV  11.60   (7.40-12.00)  fL


 


Nucleated RBC %  0.0   /100WBC


 


Nucleated RBCs #  0   K/uL


 


Blood Type   A POSITIVE  


 


Antibody Screen   NEGATIVE  











Med Orders - Current: 


                               Current Medications





Butorphanol Tartrate (Stadol)  1 mg IVPUSH Q1H PRN


   PRN Reason: Pain


Carboprost Tromethamine (Hemabate Ds)  250 mcg IM ASDIRECTED PRN


   PRN Reason: Post Partum Hemorrhage


Oxytocin/Sodium Chloride (Oxytocin 30 Unit/500 Ml-Ns)  30 unit in 500 mls @ 999 

mls/hr IV TITRATE SIA


Tranexamic Acid 1,000 mg/ (Sodium Chloride)  110 mls @ 660 mls/hr IV ONETIME PRN


   PRN Reason: Bleeding


Oxytocin/Sodium Chloride (Oxytocin 30 Unit/500 Ml-Ns)  30 unit in 500 mls @ 2 

mls/hr IV TITRATE SIA; Protocol


   Last Titration: 20 16:30 Dose:  10 munits/min, 10 mls/hr


   Documented by: 


Clindamycin Phosphate 900 mg/ (Premix)  50 mls @ 100 mls/hr IV Q8H SIA


   Last Admin: 20 19:00 Dose:  100 mls/hr


   Documented by: 


Lactated Ringer's (Ringers, Lactated)  1,000 mls @ 150 mls/hr IV ASDIRECTED SIA


   Last Admin: 20 10:24 Dose:  150 mls/hr


   Documented by: 


Lidocaine HCl (Xylocaine 1%)  50 ml INJECT ONETIME PRN


   PRN Reason: Laceration repair


Methylergonovine Maleate (Methergine)  0.2 mg IM ASDIRECTED PRN


   PRN Reason: Post Partum Hemorrhage


Misoprostol (Cytotec)  200 mcg PO ONETIME PRN


   PRN Reason: Post Partum Hemorrhage


Misoprostol (Cytotec)  25 mcg VAG Q4H PRN


   PRN Reason: Cervical Ripening


   Last Admin: 20 03:17 Dose:  25 mcg


   Documented by: 


Misoprostol (Cytotec)  25 mcg PO Q4H PRN


   PRN Reason: Cervical Ripening


   Last Admin: 20 03:18 Dose:  25 mcg


   Documented by: 


Ondansetron HCl (Zofran)  4 mg IVPUSH Q6H PRN


   PRN Reason: Nausea/Vomiting


Sodium Chloride (Saline Flush)  10 ml FLUSH ASDIRECTED PRN


   PRN Reason: Keep Vein Open


Sodium Chloride (Saline Flush)  2.5 ml FLUSH ASDIRECTED PRN


   PRN Reason: Keep Vein Open


Sodium Chloride (Normal Saline)  10 ml IV ASDIRECTED PRN


   PRN Reason: IV Use


Sterile Water (Sterile Water For Irrigation)  1,000 ml IRR ASDIRECTED PRN


   PRN Reason: delivery


Terbutaline Sulfate (Brethine)  0.25 mg SUBCUT ASDIRECTED PRN


   PRN Reason: Tacysystole





Discontinued Medications





Fentanyl (Sublimaze) Confirm Administered Dose 100 mcg .ROUTE .STK-MED ONE


   Stop: 20 17:02


Ropivacaine (Naropin 0.2%) Confirm Administered Dose 100 mls @ as directed 

.ROUTE .STK-MED ONE


   Stop: 20 17:02


Ropivacaine (Naropin 0.2%) Confirm Administered Dose 20 ml .ROUTE .STK-MED ONE


   Stop: 20 17:02











- Exam


General: Alert, Oriented


Psy/Mental Status: Alert, Normal Affect, Normal Mood





- Problem List & Annotations


(1) Encounter for induction of labor


SNOMED Code(s): 907339569


   Code(s): Z34.90 - ENCNTR FOR SUPRVSN OF NORMAL PREGNANCY, UNSP, UNSP 

TRIMESTER   Status: Acute   Priority: High   Current Visit: Yes   





(2) Normal intrauterine pregnancy in third trimester


SNOMED Code(s): 86229585, 40679908


   Code(s): Z34.93 - ENCNTR FOR SUPRVSN OF NORMAL PREG, UNSP, THIRD TRIMESTER   

Status: Acute   Priority: Medium   Current Visit: Yes   





(3) AMA (advanced maternal age) primigravida 35+


SNOMED Code(s): 70511636


   Code(s): O09.519 - SUPERVISION OF ELDERLY PRIMIGRAVIDA, UNSPECIFIED TRIMESTER

   Status: Acute   Priority: High   Current Visit: Yes   


Qualifiers: 


   Trimester: third trimester   Qualified Code(s): O09.513 - Supervision of 

elderly primigravida, third trimester   





(4)  (spontaneous vaginal delivery)


SNOMED Code(s): 674171110


   Code(s): O80 - ENCOUNTER FOR FULL-TERM UNCOMPLICATED DELIVERY   Status: Acute

   Priority: High   Current Visit: Yes   





- Problem List Review


Problem List Initiated/Reviewed/Updated: Yes





- Plan


Plan:: 


39yo G4 now  S/P  following induction with 1 round of cytotec then 

pitocin.


Prenatal history also complicated by GBS bacteruia in setting of PCN allergy, 

S/P intrapartum clindamycin x2 doses.





 of a live female, [id number] and Apgars 7/8. small 1st degree perineal 

laceration repaired


with 3-0 chromic suture in the usual fashion. EBL 400cc. Hemostasis. 


Patient tolerated procedure well, now recovering in LDR with baby, performing 

skin to skin.





Routine postpartum care.

## 2020-11-05 NOTE — [id number]
Post Anesthesia Note





- EVALUATION WITHIN 48HRS OF ANESTHETIC


Vital Signs in Normal Range: Yes


Patient Participated in Evaluation: Yes


Respiratory Function Stable: Yes


Airway Patent: Yes


Cardiovascular Function Stable: Yes


Hydration Status Stable: Yes


Pain Control Satisfactory: Yes


Nausea and Vomiting Control Satisfactory: Yes


Mental Status Recovered: Yes


Vital Signs: 


                                Last Vital Signs











Temp  36.4 C   11/05/20 05:27


 


Pulse  78   11/05/20 05:27


 


Resp  16   11/05/20 05:27


 


BP  109/67   11/05/20 05:27


 


Pulse Ox  97   11/05/20 05:27

## 2020-11-05 NOTE — PCM.PNPP
- General Info


Date of Service: 20


Admission Dx/Problem (Free Text): 


                   Patient Status Order with Admit Dx/Problem





20 02:42


Patient Status [ADT] Routine 








                           Admission Diagnosis/Problem











Admission Diagnosis/Problem    Pregnancy











Carolina is a 41 yo  PPD0 S/P uncomplicated  to term NBF.  A  pos, RNI, 

GBS pos with adequate clindamycin prophylaxis prior to birth.  Patient has no 

complaints or concerns at this time.  Patient is exclusively breastfeeding well,

 resting comfortably in bed with  in bassinet  Patient reports she is 

eating, voiding, ambulating independently and without difficulty.  Patient 

denies any problems or concerns at this time except mild-moderate intermittent 

uterine cramping relieved with Tylenol and Ibuprofen.  Patient reports moderate 

vaginal bleeding with no recent clots.  Patient verbalizes her readiness to be 

discharged home this evening.


Functional Status: Reports: Pain Controlled





- Review of Systems


General: Reports: No Symptoms


HEENT: Reports: No Symptoms


Pulmonary: Reports: No Symptoms


Cardiovascular: Reports: No Symptoms


Gastrointestinal: Reports: No Symptoms


Genitourinary: Reports: No Symptoms


Musculoskeletal: Reports: No Symptoms


Skin: Reports: No Symptoms


Neurological: Reports: No Symptoms


Psychiatric: Reports: No Symptoms





- General Info


Date of Service: 20





- Patient Data


Vital Signs - Most Recent: 


                                Last Vital Signs











Temp  98 F   20 08:00


 


Pulse  64   20 08:00


 


Resp  17   20 08:00


 


BP  111/70   20 08:00


 


Pulse Ox  97   20 08:00











Weight - Most Recent: 165 lb


Lab Results - Last 24 Hours: 


                         Laboratory Results - last 24 hr











  20 Range/Units





  19:50 05:42 


 


Hgb   12.3  (12.0-16.0)  g/dL


 


Hct   36.0  (36.0-46.0)  %


 


Cord ABG pH  7.276   (7.18-7.38)  


 


Cord ABG Base Excess  -5   (-10--2)  











Med Orders - Current: 


                               Current Medications





Acetaminophen (Tylenol Extra Strength)  500 mg PO Q4H PRN


   PRN Reason: Pain


Acetaminophen (Tylenol Extra Strength)  1,000 mg PO Q4H PRN


   PRN Reason: Pain


   Last Admin: 20 08:25 Dose:  1,000 mg


   Documented by: 


Benzocaine/Menthol (Dermoplast Pain Relief 20%-0.5% Spray)  0 gm TOP ASDIRECTED 

PRN


   PRN Reason: Perineal Comfort Measure


   Last Admin: 20 21:55 Dose:  1 can


   Documented by: 


Bisacodyl (Dulcolax)  10 mg RECTAL ONETIME PRN


   PRN Reason: Constipation


Butorphanol Tartrate (Stadol)  1 mg IVPUSH Q1H PRN


   PRN Reason: Pain


Carboprost Tromethamine (Hemabate Ds)  250 mcg IM ASDIRECTED PRN


   PRN Reason: Post Partum Hemorrhage


Docusate Sodium (Colace)  100 mg PO BID PRN


   PRN Reason: Constipation


   Last Admin: 20 21:54 Dose:  100 mg


   Documented by: 


Emollient Ointment (Lansinoh Hpa)  0 gm TOP ASDIRECTED PRN


   PRN Reason: Sore Nipples


Oxytocin/Sodium Chloride (Oxytocin 30 Unit/500 Ml-Ns)  30 unit in 500 mls @ 999 

mls/hr IV TITRATE [facility]


Tranexamic Acid 1,000 mg/ (Sodium Chloride)  110 mls @ 660 mls/hr IV ONETIME PRN


   PRN Reason: Bleeding


Oxytocin/Sodium Chloride (Oxytocin 30 Unit/500 Ml-Ns)  30 unit in 500 mls @ 2 

mls/hr IV TITRATE [facility]; Protocol


   Last Titration: 20 16:30 Dose:  10 munits/min, 10 mls/hr


   Documented by: 


Clindamycin Phosphate 900 mg/ (Premix)  50 mls @ 100 mls/hr IV Q8H SIA


   Last Admin: 20 19:00 Dose:  100 mls/hr


   Documented by: 


Lactated Ringer's (Ringers, Lactated)  1,000 mls @ 150 mls/hr IV ASDIRECTED SIA


   Last Admin: 20 10:24 Dose:  150 mls/hr


   Documented by: 


Ibuprofen (Motrin)  400 mg PO Q4H PRN


   PRN Reason: Pain


Ibuprofen (Motrin)  800 mg PO Q6H PRN


   PRN Reason: Pain


   Last Admin: 20 05:14 Dose:  800 mg


   Documented by: 


Lidocaine HCl (Xylocaine 1%)  50 ml INJECT ONETIME PRN


   PRN Reason: Laceration repair


Methylergonovine Maleate (Methergine)  0.2 mg IM ASDIRECTED PRN


   PRN Reason: Post Partum Hemorrhage


Misoprostol (Cytotec)  200 mcg PO ONETIME PRN


   PRN Reason: Post Partum Hemorrhage


Misoprostol (Cytotec)  25 mcg VAG Q4H PRN


   PRN Reason: Cervical Ripening


   Last Admin: 20 03:17 Dose:  25 mcg


   Documented by: 


Misoprostol (Cytotec)  25 mcg PO Q4H PRN


   PRN Reason: Cervical Ripening


   Last Admin: 20 03:18 Dose:  25 mcg


   Documented by: 


Ondansetron HCl (Zofran)  4 mg IVPUSH Q6H PRN


   PRN Reason: Nausea/Vomiting


Prenat Multivit/[place]/Iron/Folic Ac (Prenatal Mtr)  1 each PO DAILY SIA


   Last Admin: 20 09:37 Dose:  1 each


   Documented by: 


Sodium Chloride (Saline Flush)  10 ml FLUSH ASDIRECTED PRN


   PRN Reason: Keep Vein Open


Sodium Chloride (Saline Flush)  2.5 ml FLUSH ASDIRECTED PRN


   PRN Reason: Keep Vein Open


Sodium Chloride (Normal Saline)  10 ml IV ASDIRECTED PRN


   PRN Reason: IV Use


Sterile Water (Sterile Water For Irrigation)  1,000 ml IRR ASDIRECTED PRN


   PRN Reason: delivery


Terbutaline Sulfate (Brethine)  0.25 mg SUBCUT ASDIRECTED PRN


   PRN Reason: Tacysystole


Witch Hazel (Tucks)  1 pad TOP ASDIRECTED PRN


   PRN Reason: comfort care





Discontinued Medications





Fentanyl (Sublimaze) Confirm Administered Dose 100 mcg .ROUTE .STK-MED ONE


   Stop: 20 17:02


Ropivacaine (Naropin 0.2%) Confirm Administered Dose 100 mls @ as directed 

.ROUTE .STK-MED ONE


   Stop: 20 17:02


Ropivacaine (Naropin 0.2%) Confirm Administered Dose 20 ml .ROUTE .STK-MED ONE


   Stop: 20 17:02











- Infant Interaction


Infant Disposition, Postpartum: [place] at Bedside


Infant Interaction: Not Interacting


Infant Feeding:  Infant; Nursed Well, Continues to Breastfeed, 

Encouraged to Breastfeed


Support Person: 





- Postpartum Recovery Exam


Fundal Tone: Firm


Fundal Level: At Umbilicus


Fundal Placement: Midline


Lochia Amount: Moderate


Lochia Color: Rubra/Red


Perineum Description: Intact, Minimal Bruising/Swelling, Edematous


Episiotomy/Laceration: Approximated


Bladder Status: Voiding


Urinary Elimination: Voided





- Exam


General: Alert, Oriented, Cooperative, No Acute Distress


HEENT: Pupils Equal, Mucous Membr. Moist/Pink


Neck: Supple


Lungs: Clear to Auscultation, Normal Respiratory Effort


Cardiovascular: Regular Rate, Regular Rhythm


GI/Abdominal Exam: Normal Bowel Sounds, Soft, Non-Tender, No Organomegaly, No 

Distention


Extremities: Normal Inspection, Normal Range of Motion, Non-Tender, No Pedal 

Edema, Normal Capillary Refill


Skin: Warm, Dry, Intact


Wound/Incisions: No Drainage


Neurological: No New Focal Deficit


Psy/Mental Status: Alert, Normal Affect, Normal Mood





- Problem List & Annotations


(1)  (spontaneous vaginal delivery)


SNOMED Code(s): 867958702


   Code(s): O80 - ENCOUNTER FOR FULL-TERM UNCOMPLICATED DELIVERY   Status: Acute

   Priority: High   Current Visit: Yes   





(2) Lactating mother


SNOMED Code(s): 360563850, 978306796


   Code(s): Z39.1 - ENCOUNTER FOR CARE AND EXAMINATION OF LACTATING MOTHER   

Status: Acute   Priority: High   Current Visit: Yes   





- Problem List Review


Problem List Initiated/Reviewed/Updated: Yes





- Plan


Plan:: 


Hemodynamically stable, afebrile.  Hemoglobin 12.3, F/U as indicated.  Continue 

PO pain management as needed.  Continue eating, voiding, ambulating 

independently.  Plan to D/C this pm or tomorrow am, given patient condition 

remains stable.  Dr. Jiang notified and agreeable with POC

## 2020-11-06 NOTE — [id number]
**Discharge Summary





- Hospital Course


Free Text/Narrative:: 


Carolina is a 39 yo  ~36 hrs PP S/P uncomplicated  to term NBF.  A  pos, 

RNI, GBS pos with adequate clindamycin prophylaxis prior to birth.  Patient has 

no complaints or concerns at this time.  Patient is exclusively breastfeeding 

well, resting comfortably in bed with  in arms to right breast  Patient 

reports she is eating, voiding, ambulating independently and without difficulty.

 Patient denies any problems or concerns at this time except mild-moderate 

intermittent uterine cramping relieved with Tylenol and Ibuprofen.  Patient 

reports small vaginal bleeding with no recent clots.  Patient verbalizes her 

readiness to be discharged home this evening.





Diagnosis: Stroke: No





- Discharge Data


Discharge Date: 20


Discharge Disposition: Home, Self-Care 01


Condition: Good





- Referral to Home Health


Primary Care Physician: 


PCP None








- Discharge Diagnosis/Problem(s)


(1)  (spontaneous vaginal delivery)


SNOMED Code(s): 230657146


   ICD Code: O80 - ENCOUNTER FOR FULL-TERM UNCOMPLICATED DELIVERY   Status: 

Acute   Priority: High   Current Visit: Yes   





(2) Lactating mother


SNOMED Code(s): 908517417, 658509384


   ICD Code: Z39.1 - ENCOUNTER FOR CARE AND EXAMINATION OF LACTATING MOTHER   

Status: Acute   Priority: High   Current Visit: Yes   





- Patient Instructions


Diet: Usual Diet as Tolerated, Drink 8-10+ Glasses/Day


Activity: As Tolerated, No Strenuous Activities, Rest and Relax Today


Driving: May Drive Today


Driving, Other: Sitz baths for perineal comfort


Showering/Bathing: May Shower


Notify Provider of: Fever, Increased Pain, Swelling and Redness, Drainage, 

Nausea and/or Vomiting





- Discharge Plan


*PRESCRIPTION DRUG MONITORING PROGRAM REVIEWED*: No


*COPY OF PRESCRIPTION DRUG MONITORING REPORT IN PATIENT ARTEM: No


Prescriptions/Med Rec: 


Ibuprofen [Motrin] 800 mg PO Q8H PRN #90 tablet


 PRN Reason: Pain


Home Medications: 


                                    Home Meds





Ibuprofen [Motrin] 800 mg PO Q8H PRN #90 tablet 20 [Rx]








Oxygen Therapy Mode: Room Air


Referrals: 


[facility] [Outside]


Eva Ramos MD [Physician] - 20 2:00 pm





- Discharge Summary/Plan Comment


DC Time >30 min.: No


Discharge Summary/Plan Comment: 


Hemodynamically stable, afebrile.  Independent with ADLs, pain well controlled. 

 Ibuprofen prescription sent to pharmacy. Warning S/Ss, when to call for help 

discussed, no questions or concerns. F/U in office in 6 weeks for postpartum 

visit or sooner if problem arise.








- General Info


Date of Service: 20


Admission Dx/Problem (Free Text: 


                   Patient Status Order with Admit Dx/Problem





20 02:42


Patient Status [ADT] Routine 








                           Admission Diagnosis/Problem











Admission Diagnosis/Problem    Pregnancy











Carolina is a 39 yo  PPD0 S/P uncomplicated  to term NBF.  A  pos, RNI, 

GBS pos with adequate clindamycin prophylaxis prior to birth.  Patient has no 

complaints or concerns at this time.  Patient is exclusively breastfeeding well,

 resting comfortably in bed with  in bassinet  Patient reports she is 

eating, voiding, ambulating independently and without difficulty.  Patient 

denies any problems or concerns at this time except mild-moderate intermittent 

uterine cramping relieved with Tylenol and Ibuprofen.  Patient reports moderate 

vaginal bleeding with no recent clots.  Patient verbalizes her readiness to be 

discharged home this evening.


Functional Status: Reports: Pain Controlled





- Review of Systems


General: Reports: No Symptoms


HEENT: Reports: No Symptoms


Pulmonary: Reports: No Symptoms


Cardiovascular: Reports: No Symptoms


Gastrointestinal: Reports: No Symptoms


Genitourinary: Reports: No Symptoms


Musculoskeletal: Reports: No Symptoms


Skin: Reports: No Symptoms


Neurological: Reports: No Symptoms


Psychiatric: Reports: No Symptoms





- Patient Data


Vitals - Most Recent: 


                                Last Vital Signs











Temp  96.7 F L  20 05:51


 


Pulse  63   20 05:51


 


Resp  16   20 05:51


 


BP  118/64   20 05:51


 


Pulse Ox  96   20 05:51











Weight - Most Recent: 165 lb


Med Orders - Current: 


                               Current Medications





Acetaminophen (Tylenol Extra Strength)  500 mg PO Q4H PRN


   PRN Reason: Pain


Acetaminophen (Tylenol Extra Strength)  1,000 mg PO Q4H PRN


   PRN Reason: Pain


   Last Admin: 20 02:58 Dose:  1,000 mg


   Documented by: 


Benzocaine/Menthol (Dermoplast Pain Relief 20%-0.5% Spray)  0 gm TOP ASDIRECTED 

PRN


   PRN Reason: Perineal Comfort Measure


   Last Admin: 20 21:55 Dose:  1 can


   Documented by: 


Bisacodyl (Dulcolax)  10 mg RECTAL ONETIME PRN


   PRN Reason: Constipation


Butorphanol Tartrate (Stadol)  1 mg IVPUSH Q1H PRN


   PRN Reason: Pain


Carboprost Tromethamine (Hemabate Ds)  250 mcg IM ASDIRECTED PRN


   PRN Reason: Post Partum Hemorrhage


Docusate Sodium (Colace)  100 mg PO BID PRN


   PRN Reason: Constipation


   Last Admin: 20 06:06 Dose:  100 mg


   Documented by: 


Emollient Ointment (Lansinoh Hpa)  0 gm TOP ASDIRECTED PRN


   PRN Reason: Sore Nipples


   Last Admin: 20 15:39 Dose:  1 tube


   Documented by: 


Oxytocin/Sodium Chloride (Oxytocin 30 Unit/500 Ml-Ns)  30 unit in 500 mls @ 999 

mls/hr IV TITRATE [facility]IA


Tranexamic Acid 1,000 mg/ (Sodium Chloride)  110 mls @ 660 mls/hr IV ONETIME PRN


   PRN Reason: Bleeding


Oxytocin/Sodium Chloride (Oxytocin 30 Unit/500 Ml-Ns)  30 unit in 500 mls @ 2 

mls/hr IV TITRATE [facility]; Protocol


   Last Titration: 20 16:30 Dose:  10 munits/min, 10 mls/hr


   Documented by: 


Lactated Ringer's (Ringers, Lactated)  1,000 mls @ 150 mls/hr IV ASDIRECTED SIA


   Last Admin: 20 10:24 Dose:  150 mls/hr


   Documented by: 


Ibuprofen (Motrin)  400 mg PO Q4H PRN


   PRN Reason: Pain


Ibuprofen (Motrin)  800 mg PO Q6H PRN


   PRN Reason: Pain


   Last Admin: 20 06:07 Dose:  800 mg


   Documented by: 


Lidocaine HCl (Xylocaine 1%)  50 ml INJECT ONETIME PRN


   PRN Reason: Laceration repair


Methylergonovine Maleate (Methergine)  0.2 mg IM ASDIRECTED PRN


   PRN Reason: Post Partum Hemorrhage


Misoprostol (Cytotec)  200 mcg PO ONETIME PRN


   PRN Reason: Post Partum Hemorrhage


Misoprostol (Cytotec)  25 mcg VAG Q4H PRN


   PRN Reason: Cervical Ripening


   Last Admin: 20 03:17 Dose:  25 mcg


   Documented by: 


Misoprostol (Cytotec)  25 mcg PO Q4H PRN


   PRN Reason: Cervical Ripening


   Last Admin: 20 03:18 Dose:  25 mcg


   Documented by: 


Ondansetron HCl (Zofran)  4 mg IVPUSH Q6H PRN


   PRN Reason: Nausea/Vomiting


Prenat Multivit/[place]/Iron/Folic Ac (Prenatal Mtr)  1 each [facility]ILY [facility]


   Last Admin: 20 09:37 Dose:  1 each


   Documented by: 


Sodium Chloride (Saline Flush)  10 ml FLUSH ASDIRECTED PRN


   PRN Reason: Keep Vein Open


Sodium Chloride (Saline Flush)  2.5 ml FLUSH ASDIRECTED PRN


   PRN Reason: Keep Vein Open


Sodium Chloride (Normal Saline)  10 ml IV ASDIRECTED PRN


   PRN Reason: IV Use


Sterile Water (Sterile Water For Irrigation)  1,000 ml IRR ASDIRECTED PRN


   PRN Reason: delivery


Terbutaline Sulfate (Brethine)  0.25 mg SUBCUT ASDIRECTED PRN


   PRN Reason: Tacysystole


Witch Hazel (Tucks)  1 pad TOP ASDIRECTED PRN


   PRN Reason: comfort care





Discontinued Medications





Fentanyl (Sublimaze) Confirm Administered Dose 100 mcg .ROUTE .STK-MED ONE


   Stop: 20 17:02


Clindamycin Phosphate 900 mg/ (Premix)  50 mls @ 100 mls/hr IV [facility]H [facility]


   Last Admin: 20 19:00 Dose:  100 mls/hr


   Documented by: 


Ropivacaine (Naropin 0.2%) Confirm Administered Dose 100 mls @ as directed 

.ROUTE .STK-MED ONE


   Stop: 20 17:02


Ropivacaine (Naropin 0.2%) Confirm Administered Dose 20 ml .ROUTE .STK-MED ONE


   Stop: 20 17:02











- Exam


General: Reports: Alert, Oriented, Cooperative, No Acute Distress


HEENT: Reports: Pupils Equal, Pupils Reactive, Mucous Membr. Moist/Pink


Neck: Reports: Supple


Lungs: Reports: Clear to Auscultation, Normal Respiratory Effort


Cardiovascular: Reports: Regular Rate, Regular Rhythm


GI/Abdominal Exam: Normal Bowel Sounds, Soft, Non-Tender, No Organomegaly, No 

Distention


 (Female) Exam: Normal External Exam, Enlarged Uterus (Postpartum uterus, firm

 @U), Vaginal Bleeding (Small rubra lochia, no clots)


Rectal (Female) Exam: Normal Exam, Normal Rectal Tone


Back Exam: Reports: Normal Inspection, Full Range of Motion


Extremities: Normal Inspection, Normal Range of Motion, Non-Tender, No Pedal 

Edema, Normal Capillary Refill


Skin: Reports: Warm, Dry, Intact


Neurological: Reports: No New Focal Deficit


Psy/Mental Status: Reports: Alert, Normal Affect, Normal Mood